# Patient Record
Sex: MALE | Race: WHITE | Employment: FULL TIME | URBAN - METROPOLITAN AREA
[De-identification: names, ages, dates, MRNs, and addresses within clinical notes are randomized per-mention and may not be internally consistent; named-entity substitution may affect disease eponyms.]

---

## 2021-11-22 ENCOUNTER — OFFICE VISIT (OUTPATIENT)
Dept: FAMILY MEDICINE CLINIC | Age: 41
End: 2021-11-22
Payer: COMMERCIAL

## 2021-11-22 VITALS
SYSTOLIC BLOOD PRESSURE: 98 MMHG | TEMPERATURE: 97.6 F | BODY MASS INDEX: 23.48 KG/M2 | HEART RATE: 108 BPM | WEIGHT: 164 LBS | DIASTOLIC BLOOD PRESSURE: 62 MMHG | HEIGHT: 70 IN | OXYGEN SATURATION: 97 %

## 2021-11-22 DIAGNOSIS — Z76.89 ENCOUNTER TO ESTABLISH CARE WITH NEW DOCTOR: ICD-10-CM

## 2021-11-22 DIAGNOSIS — Z13.220 SCREENING FOR HYPERLIPIDEMIA: ICD-10-CM

## 2021-11-22 DIAGNOSIS — R35.0 URINARY FREQUENCY: ICD-10-CM

## 2021-11-22 DIAGNOSIS — F17.200 SMOKER: ICD-10-CM

## 2021-11-22 DIAGNOSIS — D17.79 LIPOMA OF OTHER SPECIFIED SITES: Primary | ICD-10-CM

## 2021-11-22 PROCEDURE — 4004F PT TOBACCO SCREEN RCVD TLK: CPT | Performed by: NURSE PRACTITIONER

## 2021-11-22 PROCEDURE — G8484 FLU IMMUNIZE NO ADMIN: HCPCS | Performed by: NURSE PRACTITIONER

## 2021-11-22 PROCEDURE — G8427 DOCREV CUR MEDS BY ELIG CLIN: HCPCS | Performed by: NURSE PRACTITIONER

## 2021-11-22 PROCEDURE — 99204 OFFICE O/P NEW MOD 45 MIN: CPT | Performed by: NURSE PRACTITIONER

## 2021-11-22 PROCEDURE — G8420 CALC BMI NORM PARAMETERS: HCPCS | Performed by: NURSE PRACTITIONER

## 2021-11-22 SDOH — ECONOMIC STABILITY: TRANSPORTATION INSECURITY
IN THE PAST 12 MONTHS, HAS LACK OF TRANSPORTATION KEPT YOU FROM MEETINGS, WORK, OR FROM GETTING THINGS NEEDED FOR DAILY LIVING?: NO

## 2021-11-22 SDOH — ECONOMIC STABILITY: TRANSPORTATION INSECURITY
IN THE PAST 12 MONTHS, HAS THE LACK OF TRANSPORTATION KEPT YOU FROM MEDICAL APPOINTMENTS OR FROM GETTING MEDICATIONS?: NO

## 2021-11-22 SDOH — ECONOMIC STABILITY: FOOD INSECURITY: WITHIN THE PAST 12 MONTHS, YOU WORRIED THAT YOUR FOOD WOULD RUN OUT BEFORE YOU GOT MONEY TO BUY MORE.: NEVER TRUE

## 2021-11-22 SDOH — ECONOMIC STABILITY: FOOD INSECURITY: WITHIN THE PAST 12 MONTHS, THE FOOD YOU BOUGHT JUST DIDN'T LAST AND YOU DIDN'T HAVE MONEY TO GET MORE.: NEVER TRUE

## 2021-11-22 ASSESSMENT — PATIENT HEALTH QUESTIONNAIRE - PHQ9
SUM OF ALL RESPONSES TO PHQ9 QUESTIONS 1 & 2: 1
SUM OF ALL RESPONSES TO PHQ QUESTIONS 1-9: 1
2. FEELING DOWN, DEPRESSED OR HOPELESS: 1
SUM OF ALL RESPONSES TO PHQ QUESTIONS 1-9: 1
SUM OF ALL RESPONSES TO PHQ QUESTIONS 1-9: 1
1. LITTLE INTEREST OR PLEASURE IN DOING THINGS: 0

## 2021-11-22 ASSESSMENT — SOCIAL DETERMINANTS OF HEALTH (SDOH): HOW HARD IS IT FOR YOU TO PAY FOR THE VERY BASICS LIKE FOOD, HOUSING, MEDICAL CARE, AND HEATING?: NOT HARD AT ALL

## 2021-11-22 NOTE — PROGRESS NOTES
[facility]  [id number] [facility]. Timmy Bruno 77  [phone]  [phone]    Lara Mo is a 42 y.o. male who is here with c/o of:    Chief Complaint: [address] and Mass (lump on back sxs for 5 years)      Patient Accompanied by: n/a    HPI - Lara Mo is here today with c/o:    Patient here to establish care. 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 Says that shortly after he developed a mass at that spot and it has continued to grow. Says that it isn't painful just large. He also has been having urinary frequency and urgency for the past two years. Says that he will get done urinating and have to go again shortly after. Denies any dysuria, weak stream or ED problems. Health Maintenance Due   Topic Date Due    Lipid screen  Never done        There is no problem list on file for this patient.      Past Medical History:   Diagnosis Date    Anxiety     Collapsed lung     Depression     Restless legs syndrome       Past Surgical History:   Procedure Laterality Date    LUNG SURGERY       Family History   Problem Relation Age of Onset    No Known Problems Mother     Kidney Disease Father     Alcohol Abuse Father      Social History     Tobacco Use    Smoking status: Current Every Day Smoker     Packs/day: 1.00     Years: 24.00     Pack years: 24.00     Types: Cigarettes    Smokeless tobacco: Never Used   Substance Use Topics  Alcohol use: Never     ALLERGIES:  No Known Allergies       Subjective   Review of Systems   Genitourinary: Positive for frequency and urgency. Skin:        Mass on skin      · Constitutional:  Negative for activity change, appetite change,unexpected weight change, chills, fever, and fatigue. · HENT: Negative for ear pain, sore throat,  Rhinorrhea, sinus pain, sinus pressure, congestion. · Eyes:  Negative for pain and discharge. · Respiratory:  Negative for chest tightness, shortness of breath, wheezing, and cough. · Cardiovascular:  Negative for chest pain, palpitations and leg swelling. · Gastrointestinal: Negative for abdominal pain, blood in stool, constipation,diarrhea, nausea and vomiting. · Endocrine: Negative for cold intolerance, heat intolerance, polydipsia, polyphagia and polyuria. · Genitourinary: Negative for difficulty urinating, dysuria, flank pain, hematuria   · Musculoskeletal: Negative for arthralgias, back pain, joint swelling, myalgias, neck pain and neck stiffness. · Skin: Negative for rash and wound. · Allergic/Immunologic: Negative for environmental allergies and food allergies. · Neurological:  Negative for dizziness, light-headedness, numbness and headaches. · Hematological:  Negative for adenopathy. Does not bruise/bleed easily. · Psychiatric/Behavioral: Negative for self-injury, sleep disturbance and suicidal ideas. Objective   Physical Exam  Skin:            Comments: Large soft, movable tissue mass noted to right upper back. Non tender        PHYSICAL EXAM:   · Constitutional: Lowe Lighter is oriented to person, place, and time. Vital signs are normal. Appears well-developed and well-nourished. · HEENT:   · Head: Normocephalic and atraumatic. Right Ear: Hearing and external ear normal. TM normal  Canal normal  · Left Ear: Hearing and external ear normal. TM normal Canal normal  · Eyes:PERRL, EOMI, Conjunctiva normal, No discharge.     · Neck: Full passive range of motion. Non-tender on palpation. Neck supple. No thyromegaly present. Trachea normal.  · Cardiovascular: Normal rate, regular rhythm, S1, S2, no murmur, no gallop, no friction rub, intact distal pulses. · Pulmonary/Chest: Breath sounds are clear throughout, No respiratory distress, No wheezing, No chest tenderness. Effort normal  · Abdominal: Soft. Normal appearance, bowel sounds are present and normoactive. There is no hepatosplenomegaly. There is no tenderness. There is no CVA tenderness. · Musculoskeletal: Extremities appear regular and symmetric. No evident masses, lesions, foreign bodies, or other abnormalities. No edema. No tenderness on palpation. Joints are stable. Full ROM, strength and tone are within normal limits. · Neurological: Alert and oriented to person, place, and time. Normal motor function, Normal sensory function, No focal deficits noted. He has normal strength. · Psychiatric: Normal mood and affect. Speech is normal and behavior is normal.     Nursing note and vitals reviewed. Blood pressure 98/62, pulse 108, temperature 97.6 °F (36.4 °C), temperature source Temporal, height 5' 10\" (1.778 m), weight 164 lb (74.4 kg), SpO2 97 %. Body mass index is 23.53 kg/m². Wt Readings from Last 3 Encounters:   11/22/21 164 lb (74.4 kg)     BP Readings from Last 3 Encounters:   11/22/21 98/62       No results found for any previous visit. No results found for this visit on 11/22/21. Completed Orders/Prescriptions   No orders of the defined types were placed in this encounter. AssessmentPlan/Medical Decision Making     1. Lipoma of other specified sites    - Will refer to gen surg for eval and tx  - CBC Auto Differential; Future  - Comprehensive Metabolic Panel; Future  - TSH with Reflex; Future  - [facility] - Deedee Burgos MD, General Surgery, [facility]    2. Smoker    - Urged to quit  - CBC Auto Differential; Future  - Comprehensive Metabolic Panel;  Future  - TSH with Reflex; Future    3. Urinary frequency    - PSA, Diagnostic; Future  - Urinalysis; Future    4. Screening for hyperlipidemia    - Lipid, Fasting; Future    5. Encounter to establish care with new doctor        Return if symptoms worsen or fail to improve. 1.  Tyler Hernandez received counseling on the following healthy behaviors: nutrition, exercise and tobacco cessation  2. Patient given educational materials - see patient instructions  3. Was a self-tracking handout given in paper form or via [employer]? No  If yes, see orders or list here. 4.  Discussed use, benefit, and side effects of prescribed medications. Barriers to medication compliance addressed. All patient questions answered. Pt voiced understanding. 5.  Reviewed prior labs, imaging, consultation, follow up, and health maintenance  6. Continue current medications, diet and exercise. 7. Discussed use, benefit, and side effects of prescribed medications. Barriers to medication compliance addressed. All her questions were answered. Pt voiced understanding. Tyler Hernandez will continue current medications, diet and exercise. Of the 45 minute duration appointment visit, Lola Saxena CNP spent at least 50% of the face-to-face time in counseling, explanation of diagnosis, planning of further management, and answering all questions.           Signed:  Lola Saxena CNP

## 2021-11-29 ENCOUNTER — HOSPITAL ENCOUNTER (OUTPATIENT)
Age: 41
Setting detail: SPECIMEN
Discharge: HOME | End: 2021-11-29

## 2021-11-29 DIAGNOSIS — D17.79 LIPOMA OF OTHER SPECIFIED SITES: ICD-10-CM

## 2021-11-29 DIAGNOSIS — Z13.220 SCREENING FOR HYPERLIPIDEMIA: ICD-10-CM

## 2021-11-29 DIAGNOSIS — F17.200 SMOKER: ICD-10-CM

## 2021-11-29 DIAGNOSIS — R35.0 URINARY FREQUENCY: ICD-10-CM

## 2021-11-29 LAB
ABSOLUTE EOS #: 0.39 K/UL (ref 0–0.44)
ABSOLUTE IMMATURE GRANULOCYTE: 0.03 K/UL (ref 0–0.3)
ABSOLUTE LYMPH #: 2.44 K/UL (ref 1.1–3.7)
ABSOLUTE MONO #: 0.92 K/UL (ref 0.1–1.2)
ALBUMIN SERPL-MCNC: 4.6 G/DL (ref 3.5–5.2)
ALBUMIN/GLOBULIN RATIO: 1.7 (ref 1–2.5)
ALP BLD-CCNC: 100 U/L (ref 40–129)
ALT SERPL-CCNC: 18 U/L (ref 5–41)
ANION GAP SERPL CALCULATED.3IONS-SCNC: 12 MMOL/L (ref 9–17)
AST SERPL-CCNC: 18 U/L
BASOPHILS # BLD: 1 % (ref 0–2)
BASOPHILS ABSOLUTE: 0.11 K/UL (ref 0–0.2)
BILIRUB SERPL-MCNC: 0.33 MG/DL (ref 0.3–1.2)
BILIRUBIN URINE: NEGATIVE
BUN BLDV-MCNC: 11 MG/DL (ref 6–20)
BUN/CREAT BLD: NORMAL (ref 9–20)
CALCIUM SERPL-MCNC: 9.4 MG/DL (ref 8.6–10.4)
CHLORIDE BLD-SCNC: 104 MMOL/L (ref 98–107)
CHOLESTEROL, FASTING: 148 MG/DL
CHOLESTEROL/HDL RATIO: 3.8
CO2: 23 MMOL/L (ref 20–31)
COLOR: YELLOW
COMMENT UA: NORMAL
CREAT SERPL-MCNC: 0.94 MG/DL (ref 0.7–1.2)
DIFFERENTIAL TYPE: ABNORMAL
EOSINOPHILS RELATIVE PERCENT: 4 % (ref 1–4)
GFR AFRICAN AMERICAN: >60 ML/MIN
GFR NON-AFRICAN AMERICAN: >60 ML/MIN
GFR SERPL CREATININE-BSD FRML MDRD: NORMAL ML/MIN/{1.73_M2}
GFR SERPL CREATININE-BSD FRML MDRD: NORMAL ML/MIN/{1.73_M2}
GLUCOSE BLD-MCNC: 90 MG/DL (ref 70–99)
GLUCOSE URINE: NEGATIVE
HCT VFR BLD CALC: 52.2 % (ref 40.7–50.3)
HDLC SERPL-MCNC: 39 MG/DL
HEMOGLOBIN: 17 G/DL (ref 13–17)
IMMATURE GRANULOCYTES: 0 %
KETONES, URINE: NEGATIVE
LDL CHOLESTEROL: 94 MG/DL (ref 0–130)
LEUKOCYTE ESTERASE, URINE: NEGATIVE
LYMPHOCYTES # BLD: 27 % (ref 24–43)
MCH RBC QN AUTO: 28.5 PG (ref 25.2–33.5)
MCHC RBC AUTO-ENTMCNC: 32.6 G/DL (ref 28.4–34.8)
MCV RBC AUTO: 87.4 FL (ref 82.6–102.9)
MONOCYTES # BLD: 10 % (ref 3–12)
NITRITE, URINE: NEGATIVE
NRBC AUTOMATED: 0 PER 100 WBC
PDW BLD-RTO: 14.8 % (ref 11.8–14.4)
PH UA: 6 (ref 5–8)
PLATELET # BLD: 261 K/UL (ref 138–453)
PLATELET ESTIMATE: ABNORMAL
PMV BLD AUTO: 11.6 FL (ref 8.1–13.5)
POTASSIUM SERPL-SCNC: 4.2 MMOL/L (ref 3.7–5.3)
PROSTATE SPECIFIC ANTIGEN: 0.54 UG/L
PROTEIN UA: NEGATIVE
RBC # BLD: 5.97 M/UL (ref 4.21–5.77)
RBC # BLD: ABNORMAL 10*6/UL
SEG NEUTROPHILS: 58 % (ref 36–65)
SEGMENTED NEUTROPHILS ABSOLUTE COUNT: 5.22 K/UL (ref 1.5–8.1)
SODIUM BLD-SCNC: 139 MMOL/L (ref 135–144)
SPECIFIC GRAVITY UA: 1.02 (ref 1–1.03)
TOTAL PROTEIN: 7.3 G/DL (ref 6.4–8.3)
TRIGLYCERIDE, FASTING: 74 MG/DL
TSH SERPL DL<=0.05 MIU/L-ACNC: 1.82 MIU/L (ref 0.3–5)
TURBIDITY: CLEAR
URINE HGB: NEGATIVE
UROBILINOGEN, URINE: NORMAL
VLDLC SERPL CALC-MCNC: ABNORMAL MG/DL (ref 1–30)
WBC # BLD: 9.1 K/UL (ref 3.5–11.3)
WBC # BLD: ABNORMAL 10*3/UL

## 2022-01-13 ENCOUNTER — OFFICE VISIT (OUTPATIENT)
Dept: FAMILY MEDICINE CLINIC | Age: 42
End: 2022-01-13
Payer: COMMERCIAL

## 2022-01-13 VITALS
HEART RATE: 82 BPM | BODY MASS INDEX: 23.1 KG/M2 | OXYGEN SATURATION: 97 % | TEMPERATURE: 96.8 F | SYSTOLIC BLOOD PRESSURE: 118 MMHG | DIASTOLIC BLOOD PRESSURE: 62 MMHG | WEIGHT: 161 LBS

## 2022-01-13 DIAGNOSIS — F43.81 PROLONGED GRIEF REACTION: Primary | ICD-10-CM

## 2022-01-13 PROCEDURE — 99214 OFFICE O/P EST MOD 30 MIN: CPT | Performed by: NURSE PRACTITIONER

## 2022-01-13 PROCEDURE — 4004F PT TOBACCO SCREEN RCVD TLK: CPT | Performed by: NURSE PRACTITIONER

## 2022-01-13 PROCEDURE — G8484 FLU IMMUNIZE NO ADMIN: HCPCS | Performed by: NURSE PRACTITIONER

## 2022-01-13 PROCEDURE — G8427 DOCREV CUR MEDS BY ELIG CLIN: HCPCS | Performed by: NURSE PRACTITIONER

## 2022-01-13 PROCEDURE — G8420 CALC BMI NORM PARAMETERS: HCPCS | Performed by: NURSE PRACTITIONER

## 2022-01-13 ASSESSMENT — PATIENT HEALTH QUESTIONNAIRE - PHQ9
SUM OF ALL RESPONSES TO PHQ QUESTIONS 1-9: 2
SUM OF ALL RESPONSES TO PHQ QUESTIONS 1-9: 2
2. FEELING DOWN, DEPRESSED OR HOPELESS: 1
SUM OF ALL RESPONSES TO PHQ QUESTIONS 1-9: 2
SUM OF ALL RESPONSES TO PHQ9 QUESTIONS 1 & 2: 2
1. LITTLE INTEREST OR PLEASURE IN DOING THINGS: 1
SUM OF ALL RESPONSES TO PHQ QUESTIONS 1-9: 2

## 2022-01-13 NOTE — PROGRESS NOTES
[facility] 141  [id number] [facility]. Shai Barahonaer  Timmy Michaels 77  [phone]  [phone]    Magali Bentley is a 42 y.o. male who is here with c/o of:    Chief Complaint: Depression      Patient Accompanied by: n/a    HPI - Magali Bentley is here today with c/o:    Patient here for depression evaluation. Reports that his father passed away in March 2021. He says he was doing ok because he was staying busy but in the last month he has been more emotional and tearful. He says he has history of bipolar depression. He was on medication but had taken himself off because he was feeling better. Denies SI or HI- says that he has had a few times where he felt it would be better off if he wasn't here but has not had any plans of acting on his thoughts. PHQ-9 Total Score: 2 (1/13/2022  9:45 AM)    On the basis of positive PHQ-9 screening (PHQ-9 Total Score: 2), the following plan was implemented: additional evaluation and assessment performed and medication prescribed - patient will call for any significant medication side effects or worsening symptoms of depression. Patient will follow-up in 4 week(s) with PCP. Health Maintenance Due   Topic Date Due    Pneumococcal 0-64 years Vaccine (1 of 2 - PPSV23) Never done        There is no problem list on file for this patient.      Past Medical History:   Diagnosis Date    Anxiety     Collapsed lung     Depression     Restless legs syndrome       Past Surgical History:   Procedure Laterality Date    LUNG SURGERY       Family History   Problem Relation Age of Onset    No Known Problems Mother     Kidney Disease Father     Alcohol Abuse Father      Social History     Tobacco Use    Smoking status: Current Every Day Smoker     Packs/day: 1.00     Years: 24.00     Pack years: 24.00     Types: Cigarettes    Smokeless tobacco: Never Used   Substance Use Topics    Alcohol use: Never     ALLERGIES:  No Known Allergies Subjective   Review of Systems   · Constitutional:  Negative for activity change, appetite change,unexpected weight change, chills, fever, and fatigue. · HENT: Negative for ear pain, sore throat,  Rhinorrhea, sinus pain, sinus pressure, congestion. · Eyes:  Negative for pain and discharge. · Respiratory:  Negative for chest tightness, shortness of breath, wheezing, and cough. · Cardiovascular:  Negative for chest pain, palpitations and leg swelling. · Gastrointestinal: Negative for abdominal pain, blood in stool, constipation,diarrhea, nausea and vomiting. · Endocrine: Negative for cold intolerance, heat intolerance, polydipsia, polyphagia and polyuria. · Genitourinary: Negative for difficulty urinating, dysuria, flank pain, frequency, hematuria and urgency. · Musculoskeletal: Negative for arthralgias, back pain, joint swelling, myalgias, neck pain and neck stiffness. · Skin: Negative for rash and wound. · Allergic/Immunologic: Negative for environmental allergies and food allergies. · Neurological:  Negative for dizziness, light-headedness, numbness and headaches. · Hematological:  Negative for adenopathy. Does not bruise/bleed easily. · Psychiatric/Behavioral: Negative for self-injury, sleep disturbance and suicidal ideas. Objective   Physical Exam   PHYSICAL EXAM:   · Constitutional: Gisela Rogers is oriented to person, place, and time. Vital signs are normal. Appears well-developed and well-nourished. · Eyes:PERRL, EOMI, Conjunctiva normal, No discharge. · Cardiovascular: Normal rate, regular rhythm, S1, S2, no murmur, no gallop, no friction rub, intact distal pulses. · Pulmonary/Chest: Breath sounds are clear throughout, No respiratory distress, No wheezing, No chest tenderness. Effort normal  · Neurological: Alert and oriented to person, place, and time. Normal motor function, Normal sensory function, No focal deficits noted. He has normal strength.   · Skin: Skin is warm, dry and intact. No obvious lesions on exposed skin  · Psychiatric: Normal mood and affect. Speech is normal and behavior is normal.     Nursing note and vitals reviewed. Blood pressure 118/62, pulse 82, temperature 96.8 °F (36 °C), temperature source Temporal, weight 161 lb (73 kg), SpO2 97 %. Body mass index is 23.1 kg/m².     Wt Readings from Last 3 Encounters:   01/13/22 161 lb (73 kg)   11/22/21 164 lb (74.4 kg)     BP Readings from Last 3 Encounters:   01/13/22 118/62   11/22/21 2712 [facility] Outpatient Visit on 11/29/2021   Component Date Value Ref Range Status    WBC 11/29/2021 9.1  3.5 - 11.3 k/uL Final    RBC 11/29/2021 5.97* 4.21 - 5.77 m/uL Final    Hemoglobin 11/29/2021 17.0  13.0 - 17.0 g/dL Final    Hematocrit 11/29/2021 52.2* 40.7 - 50.3 % Final    MCV 11/29/2021 87.4  82.6 - 102.9 fL Final    MCH 11/29/2021 28.5  25.2 - 33.5 pg Final    MCHC 11/29/2021 32.6  28.4 - 34.8 g/dL Final    RDW 11/29/2021 14.8* 11.8 - 14.4 % Final    Platelets 13/07/6274 261  138 - 453 k/uL Final    MPV 11/29/2021 11.6  8.1 - 13.5 fL Final    NRBC Automated 11/29/2021 0.0  0.0 per 100 WBC Final    Differential Type 11/29/2021 NOT REPORTED   Final    Seg Neutrophils 11/29/2021 58  36 - 65 % Final    Lymphocytes 11/29/2021 27  24 - 43 % Final    Monocytes 11/29/2021 10  3 - 12 % Final    Eosinophils % 11/29/2021 4  1 - 4 % Final    Basophils 11/29/2021 1  0 - 2 % Final    Immature Granulocytes 11/29/2021 0  0 % Final    Segs Absolute 11/29/2021 5.22  1.50 - 8.10 k/uL Final    Absolute Lymph # 11/29/2021 2.44  1.10 - 3.70 k/uL Final    Absolute Mono # 11/29/2021 0.92  0.10 - 1.20 k/uL Final    Absolute Eos # 11/29/2021 0.39  0.00 - 0.44 k/uL Final    Basophils Absolute 11/29/2021 0.11  0.00 - 0.20 k/uL Final    Absolute Immature Granulocyte 11/29/2021 0.03  0.00 - 0.30 k/uL Final    WBC Morphology 11/29/2021 NOT REPORTED   Final    RBC Morphology 11/29/2021 ANISOCYTOSIS PRESENT   Final  Platelet Estimate 71/40/2069 NOT REPORTED   Final    Glucose 11/29/2021 90  70 - 99 mg/dL Final    BUN 11/29/2021 11  6 - 20 mg/dL Final    CREATININE 11/29/2021 0.94  0.70 - 1.20 mg/dL Final    Bun/Cre Ratio 11/29/2021 NOT REPORTED  9 - 20 Final    Calcium 11/29/2021 9.4  8.6 - 10.4 mg/dL Final    Sodium 11/29/2021 139  135 - 144 mmol/L Final    Potassium 11/29/2021 4.2  3.7 - 5.3 mmol/L Final    Chloride 11/29/2021 104  98 - 107 mmol/L Final    CO2 11/29/2021 23  20 - 31 mmol/L Final    Anion Gap 11/29/2021 12  9 - 17 mmol/L Final    Alkaline Phosphatase 11/29/2021 100  40 - 129 U/L Final    ALT 11/29/2021 18  5 - 41 U/L Final    AST 11/29/2021 18  <40 U/L Final    Total Bilirubin 11/29/2021 0.33  0.3 - 1.2 mg/dL Final    Total Protein 11/29/2021 7.3  6.4 - 8.3 g/dL Final    Albumin 11/29/2021 4.6  3.5 - 5.2 g/dL Final    Albumin/Globulin Ratio 11/29/2021 1.7  1.0 - 2.5 Final    GFR Non- 11/29/2021 >60  >60 mL/min Final    GFR  11/29/2021 >60  >60 mL/min Final    GFR Comment 11/29/2021        Final    Comment: Average GFR for 38-51 years old:   80 mL/min/1.73sq m  Chronic Kidney Disease:   <60 mL/min/1.73sq m  Kidney failure:   <15 mL/min/1.73sq m              eGFR calculated using average adult body mass.  Additional eGFR calculator available at:        [email or web address]            GFR Staging 11/29/2021 NOT REPORTED   Final    TSH 11/29/2021 1.82  0.30 - 5.00 mIU/L Final    Cholesterol, Fasting 11/29/2021 148  <200 mg/dL Final    Comment:    Cholesterol Guidelines:      <200  Desirable   200-240  Borderline      >240  Undesirable         HDL 11/29/2021 39* >40 mg/dL Final    Comment:    HDL Guidelines:    <40     Undesirable   40-59    Borderline    >59     Desirable         LDL Cholesterol 11/29/2021 94  0 - 130 mg/dL Final    Comment:    LDL Guidelines:     <100    Desirable   100-129   Near to/above Desirable   130-159 Borderline      >159   Undesirable     Direct (measured) LDL and calculated LDL are not interchangeable tests.  Chol/HDL Ratio 11/29/2021 3.8  <5 Final            Triglyceride, Fasting 11/29/2021 74  <150 mg/dL Final    Comment:    Triglyceride Guidelines:     <150   Desirable   150-199  Borderline   200-499  High     >499   Very high   Based on AHA Guidelines for fasting triglyceride, October 2012.  VLDL 11/29/2021 NOT REPORTED  1 - 30 mg/dL Final    PSA 11/29/2021 0.54  <4.1 ug/L Final    Comment: The Roche \"ECLIA\" assay is used. Results obtained with different assay methods cannot be   used interchangeably.  Color, UA 11/29/2021 Yellow  Yellow Final    Turbidity UA 11/29/2021 Clear  Clear Final    Glucose, Ur 11/29/2021 NEGATIVE  NEGATIVE Final    Bilirubin Urine 11/29/2021 NEGATIVE  NEGATIVE Final    Ketones, Urine 11/29/2021 NEGATIVE  NEGATIVE Final    Specific [place], UA 11/29/2021 1.022  1.005 - 1.030 Final    Urine Hgb 11/29/2021 NEGATIVE  NEGATIVE Final    pH, UA 11/29/2021 6.0  5.0 - 8.0 Final    Protein, UA 11/29/2021 NEGATIVE  NEGATIVE Final    Urobilinogen, Urine 11/29/2021 Normal  Normal Final    Nitrite, Urine 11/29/2021 NEGATIVE  NEGATIVE Final    Leukocyte Esterase, Urine 11/29/2021 NEGATIVE  NEGATIVE Final    Urinalysis Comments 11/29/2021 Microscopic exam not performed based on chemical results unless requested in original order. Final     No results found for this visit on 01/13/22. Completed Orders/Prescriptions   Orders Placed This Encounter   Medications    sertraline (ZOLOFT) 50 MG tablet     Sig: Take 1 tablet by mouth daily     Dispense:  30 tablet     Refill:  3               AssessmentPlan/Medical Decision Making     1. Prolonged grief reaction    - Side effects discussed  - Gave counseling options if he would like to start  - sertraline (ZOLOFT) 50 MG tablet; Take 1 tablet by mouth daily  Dispense: 30 tablet;  Refill: 3      Return in about 4 weeks (around 2/10/2022) for f/u depression. 1.  Ralph Ellison received counseling on the following healthy behaviors: medication adherence  2. Patient given educational materials - see patient instructions  3. Was a self-tracking handout given in paper form or via [employer]? No  If yes, see orders or list here. 4.  Discussed use, benefit, and side effects of prescribed medications. Barriers to medication compliance addressed. All patient questions answered. Pt voiced understanding. 5.  Reviewed prior labs, imaging, consultation, follow up, and health maintenance  6. Continue current medications, diet and exercise. 7. Discussed use, benefit, and side effects of prescribed medications. Barriers to medication compliance addressed. All her questions were answered. Pt voiced understanding. Ralph Ellison will continue current medications, diet and exercise. Of the 30 minute duration appointment visit, Chaparro Black CNP spent at least 50% of the face-to-face time in counseling, explanation of diagnosis, planning of further management, and answering all questions.           Signed:  Chaparro Black CNP